# Patient Record
Sex: FEMALE | Race: WHITE
[De-identification: names, ages, dates, MRNs, and addresses within clinical notes are randomized per-mention and may not be internally consistent; named-entity substitution may affect disease eponyms.]

---

## 2019-07-24 ENCOUNTER — HOSPITAL ENCOUNTER (EMERGENCY)
Dept: HOSPITAL 25 - UCCORT | Age: 69
Discharge: HOME | End: 2019-07-24
Payer: MEDICARE

## 2019-07-24 VITALS — DIASTOLIC BLOOD PRESSURE: 49 MMHG | SYSTOLIC BLOOD PRESSURE: 132 MMHG

## 2019-07-24 DIAGNOSIS — F17.210: ICD-10-CM

## 2019-07-24 DIAGNOSIS — R22.32: Primary | ICD-10-CM

## 2019-07-24 DIAGNOSIS — Z79.82: ICD-10-CM

## 2019-07-24 PROCEDURE — G0463 HOSPITAL OUTPT CLINIC VISIT: HCPCS

## 2019-07-24 PROCEDURE — 99211 OFF/OP EST MAY X REQ PHY/QHP: CPT

## 2019-07-24 NOTE — UC
Upper Extremity HPI





- HPI Summary


HPI Summary: 





Pt is accompanied by sister. Pt c/o left upper arm swelling. Sister noticed 

that pt's left upper arm looked bigger than the right and pt states that she 

noticed that the upper arm is tender to touch.  Denies injury. Denies hx of DVT

, denies hx of erythema or bursitis 





- History of Current Complaint


Chief Complaint: UCUpperExtremity


Stated Complaint: LEFT ARM SWOLLEN


Time Seen by Provider: 07/24/19 16:39


Hx Obtained From: Patient


Hx Last Menstrual Period: Age 41


Pregnant?: No


Onset/Duration: Gradual Onset, Other - unsure of lenght of time


Severity Initially: Mild


Severity Currently: Mild


Pain Intensity: 2


Location Of Pain: Is Diffuse - left upper arm


Aggravating Factor(s): Other - palpation


Alleviating Factor(s): Rest


Associated Signs And Symptoms: Positive: Swelling


Related History: Dominant Hand Right





- Risk Factors


Non-Orthopedic Risk Factor: Negative


DVT Risk Factors: Negative


Septic Arthritis Risk Factor: Negative


Compartment Syndrome Risk Factors: Pain





- Allergies/Home Medications


Allergies/Adverse Reactions: 


 Allergies











Allergy/AdvReac Type Severity Reaction Status Date / Time


 


No Known Allergies Allergy   Verified 07/24/19 16:41











Home Medications: 


 Home Medications





Aspirin [Aspir-Low] 81 mg PO DAILY 07/24/19 [History Confirmed 07/24/19]


Omeprazole 40 mg PO DAILY 07/24/19 [History Confirmed 07/24/19]











PMH/Surg Hx/FS Hx/Imm Hx


Previously Healthy: Yes





- Surgical History


Surgical History: Yes


Surgery Procedure, Year, and Place: thyroidectomy, cholecystectomy 2003-04





- Family History


Known Family History: Positive: Cardiac Disease





- Social History


Occupation: Retired


Lives: With Family


Alcohol Use: Weekly


Alcohol Amount: 2-3 beers


Substance Use Type: None


Smoking Status (MU): Light Every Day Tobacco Smoker


Have You Smoked in the Last Year: Yes





Review of Systems


All Other Systems Reviewed And Are Negative: Yes


Constitutional: Positive: Negative


Skin: Positive: Negative


Eyes: Positive: Negative


ENT: Positive: Negative


Respiratory: Positive: Negative


Cardiovascular: Positive: Negative


Gastrointestinal: Positive: Negative


Genitourinary: Positive: Negative


Motor: Positive: Negative


Neurovascular: Positive: Negative


Musculoskeletal: Positive: Edema - left upper arm


Neurological: Positive: Negative


Psychological: Positive: Negative


Is Patient Immunocompromised?: No





Physical Exam


Triage Information Reviewed: Yes


Appearance: Obese


Vital Signs: 


 Initial Vital Signs











Temp  98.2 F   07/24/19 16:45


 


Pulse  92   07/24/19 16:45


 


Resp  18   07/24/19 16:45


 


BP  132/49   07/24/19 16:45


 


Pulse Ox  97   07/24/19 16:45











Vital Signs Reviewed: Yes


Eye Exam: Normal


ENT Exam: Normal


Dental Exam: Normal


Neck exam: Normal


Respiratory: Positive: No respiratory distress


Musculoskeletal: Positive: Edema @ - left upper arm measured 44.5 cm right 

upper arm measured 41 cm


Neurological Exam: Normal


Psychological Exam: Normal


Skin Exam: Normal





Upper Extremity Course/Dx





- Differential Dx/Diagnosis


Differential Diagnosis/HQI/PQRI: Bursitis, Other - I discussed with the pt the 

need f/u with her PCP pt verbalized understanding and agreed to plan of care


Provider Diagnosis: 


 Left upper extremity swelling








Discharge





- Sign-Out/Discharge


Documenting (check all that apply): Patient Departure


All imaging exams completed and their final reports reviewed: No Studies





- Discharge Plan


Condition: Stable


Disposition: HOME


Patient Education Materials:  Arm Pain (ED)


Referrals: 


Clara Nieves PA [Primary Care Provider] - As Soon As Possible





- Billing Disposition and Condition


Condition: STABLE


Disposition: Home